# Patient Record
Sex: FEMALE | Race: WHITE | Employment: OTHER | URBAN - METROPOLITAN AREA
[De-identification: names, ages, dates, MRNs, and addresses within clinical notes are randomized per-mention and may not be internally consistent; named-entity substitution may affect disease eponyms.]

---

## 2021-10-04 ENCOUNTER — HOSPITAL ENCOUNTER (EMERGENCY)
Age: 31
Discharge: HOME | End: 2021-10-04

## 2021-10-04 VITALS
WEIGHT: 135 LBS | OXYGEN SATURATION: 98 % | TEMPERATURE: 98 F | SYSTOLIC BLOOD PRESSURE: 132 MMHG | DIASTOLIC BLOOD PRESSURE: 90 MMHG | HEIGHT: 62 IN | RESPIRATION RATE: 18 BRPM | BODY MASS INDEX: 24.84 KG/M2 | HEART RATE: 76 BPM

## 2021-10-04 DIAGNOSIS — S05.01XA ABRASION OF RIGHT CORNEA, INITIAL ENCOUNTER: Primary | ICD-10-CM

## 2021-10-04 PROCEDURE — 90471 IMMUNIZATION ADMIN: CPT

## 2021-10-04 PROCEDURE — 99283 EMERGENCY DEPT VISIT LOW MDM: CPT

## 2021-10-04 RX ORDER — OFLOXACIN 3 MG/ML
2 SOLUTION/ DROPS OPHTHALMIC 4 TIMES DAILY
Qty: 1 EACH | Refills: 0 | Status: SHIPPED | OUTPATIENT
Start: 2021-10-04 | End: 2021-10-09

## 2021-10-04 RX ORDER — TETRACAINE HYDROCHLORIDE 5 MG/ML
1 SOLUTION OPHTHALMIC ONCE
Status: COMPLETED | OUTPATIENT
Start: 2021-10-04 | End: 2021-10-04

## 2021-10-04 RX ORDER — HYDROCODONE BITARTRATE AND ACETAMINOPHEN 5; 325 MG/1; MG/1
1 TABLET ORAL ONCE
Status: COMPLETED | OUTPATIENT
Start: 2021-10-04 | End: 2021-10-04

## 2021-10-04 RX ORDER — BUDESONIDE AND FORMOTEROL FUMARATE DIHYDRATE 160; 4.5 UG/1; UG/1
AEROSOL RESPIRATORY (INHALATION) 2 TIMES DAILY
COMMUNITY

## 2021-10-04 NOTE — ED PROVIDER NOTES
Patient Seen in: THE [facility] Emergency Department In [place]      History   Patient presents with:   Eye Visual Problem    Stated Complaint: glitter in eye    Subjective:   HPI    52-year-old female who comes in today complaining of the significant pain not foreign body. No surrounding erythema or edema. Ears: Bilateral:TM clear and pearly gray color. No external auditory canal edema or discharge. No pinna, tragus, or mastoid TTP.   Nose: Nares symmetrical, No maxillary or frontal sinus tenderness   Thro patient instructions regarding her diagnosis, expectations, follow up, and return to the ER precautions. I explained to the patient that emergent conditions may arise to return to the immediate care or ER for new, worsening or any persistent conditions.

## 2021-10-04 NOTE — ED QUICK NOTES
No relief with tetracaine. Unable to complete visual acuity. Ice pack to right eye.  Right eye sclera is reddened and eye is tearing

## 2021-10-04 NOTE — ED INITIAL ASSESSMENT (HPI)
States yesterday while at music [place] she got something in her right eye possible glitter. Eye is watering and painful. Eye was flushed last night but continues to bother her. Unable to open eye without pain and watering.  Could not tolerate visual acuity

## 2023-03-26 ENCOUNTER — HOSPITAL ENCOUNTER (EMERGENCY)
Age: 33
Discharge: HOME | End: 2023-03-27
Attending: EMERGENCY MEDICINE
Payer: MEDICAID

## 2023-03-26 ENCOUNTER — APPOINTMENT (OUTPATIENT)
Dept: GENERAL RADIOLOGY | Age: 33
End: 2023-03-26
Attending: EMERGENCY MEDICINE
Payer: MEDICAID

## 2023-03-26 DIAGNOSIS — R07.89 CHEST PAIN, NON-CARDIAC: Primary | ICD-10-CM

## 2023-03-26 LAB
ALBUMIN SERPL-MCNC: 3.9 G/DL (ref 3.4–5)
ALBUMIN/GLOB SERPL: 1.3 {RATIO} (ref 1–2)
ALP LIVER SERPL-CCNC: 68 U/L
ALT SERPL-CCNC: 19 U/L
ANION GAP SERPL CALC-SCNC: 6 MMOL/L (ref 0–18)
AST SERPL-CCNC: 14 U/L (ref 15–37)
BASOPHILS # BLD AUTO: 0.08 X10(3) UL (ref 0–0.2)
BASOPHILS NFR BLD AUTO: 0.9 %
BILIRUB SERPL-MCNC: 0.3 MG/DL (ref 0.1–2)
BUN BLD-MCNC: 9 MG/DL (ref 7–18)
CALCIUM BLD-MCNC: 8.6 MG/DL (ref 8.5–10.1)
CHLORIDE SERPL-SCNC: 108 MMOL/L (ref 98–112)
CO2 SERPL-SCNC: 25 MMOL/L (ref 21–32)
CREAT BLD-MCNC: 0.82 MG/DL
D DIMER PPP FEU-MCNC: 0.3 UG/ML FEU (ref ?–0.5)
EOSINOPHIL # BLD AUTO: 0.5 X10(3) UL (ref 0–0.7)
EOSINOPHIL NFR BLD AUTO: 5.6 %
ERYTHROCYTE [DISTWIDTH] IN BLOOD BY AUTOMATED COUNT: 12.2 %
GFR SERPLBLD BASED ON 1.73 SQ M-ARVRAT: 98 ML/MIN/1.73M2 (ref 60–?)
GLOBULIN PLAS-MCNC: 3 G/DL (ref 2.8–4.4)
GLUCOSE BLD-MCNC: 131 MG/DL (ref 70–99)
HCT VFR BLD AUTO: 37.4 %
HGB BLD-MCNC: 12.6 G/DL
IMM GRANULOCYTES # BLD AUTO: 0.02 X10(3) UL (ref 0–1)
IMM GRANULOCYTES NFR BLD: 0.2 %
LYMPHOCYTES # BLD AUTO: 2.65 X10(3) UL (ref 1–4)
LYMPHOCYTES NFR BLD AUTO: 29.7 %
MCH RBC QN AUTO: 29.6 PG (ref 26–34)
MCHC RBC AUTO-ENTMCNC: 33.7 G/DL (ref 31–37)
MCV RBC AUTO: 88 FL
MONOCYTES # BLD AUTO: 0.63 X10(3) UL (ref 0.1–1)
MONOCYTES NFR BLD AUTO: 7.1 %
NEUTROPHILS # BLD AUTO: 5.05 X10 (3) UL (ref 1.5–7.7)
NEUTROPHILS # BLD AUTO: 5.05 X10(3) UL (ref 1.5–7.7)
NEUTROPHILS NFR BLD AUTO: 56.5 %
OSMOLALITY SERPL CALC.SUM OF ELEC: 288 MOSM/KG (ref 275–295)
PLATELET # BLD AUTO: 167 10(3)UL (ref 150–450)
POTASSIUM SERPL-SCNC: 3.9 MMOL/L (ref 3.5–5.1)
PROT SERPL-MCNC: 6.9 G/DL (ref 6.4–8.2)
RBC # BLD AUTO: 4.25 X10(6)UL
SODIUM SERPL-SCNC: 139 MMOL/L (ref 136–145)
TROPONIN I HIGH SENSITIVITY: 4 NG/L
WBC # BLD AUTO: 8.9 X10(3) UL (ref 4–11)

## 2023-03-26 PROCEDURE — 99285 EMERGENCY DEPT VISIT HI MDM: CPT

## 2023-03-26 PROCEDURE — 99284 EMERGENCY DEPT VISIT MOD MDM: CPT

## 2023-03-26 PROCEDURE — 80053 COMPREHEN METABOLIC PANEL: CPT | Performed by: EMERGENCY MEDICINE

## 2023-03-26 PROCEDURE — 93005 ELECTROCARDIOGRAM TRACING: CPT

## 2023-03-26 PROCEDURE — 96374 THER/PROPH/DIAG INJ IV PUSH: CPT

## 2023-03-26 PROCEDURE — 84484 ASSAY OF TROPONIN QUANT: CPT | Performed by: EMERGENCY MEDICINE

## 2023-03-26 PROCEDURE — 85025 COMPLETE CBC W/AUTO DIFF WBC: CPT | Performed by: EMERGENCY MEDICINE

## 2023-03-26 PROCEDURE — 85379 FIBRIN DEGRADATION QUANT: CPT | Performed by: EMERGENCY MEDICINE

## 2023-03-26 PROCEDURE — 93010 ELECTROCARDIOGRAM REPORT: CPT

## 2023-03-26 PROCEDURE — 71045 X-RAY EXAM CHEST 1 VIEW: CPT | Performed by: EMERGENCY MEDICINE

## 2023-03-26 RX ORDER — KETOROLAC TROMETHAMINE 30 MG/ML
30 INJECTION, SOLUTION INTRAMUSCULAR; INTRAVENOUS ONCE
Status: COMPLETED | OUTPATIENT
Start: 2023-03-26 | End: 2023-03-26

## 2023-03-27 VITALS
RESPIRATION RATE: 15 BRPM | HEART RATE: 79 BPM | DIASTOLIC BLOOD PRESSURE: 85 MMHG | WEIGHT: 145 LBS | SYSTOLIC BLOOD PRESSURE: 124 MMHG | BODY MASS INDEX: 25.69 KG/M2 | HEIGHT: 63 IN | TEMPERATURE: 98 F | OXYGEN SATURATION: 99 %

## 2023-03-27 LAB
ATRIAL RATE: 92 BPM
P AXIS: 68 DEGREES
P-R INTERVAL: 150 MS
Q-T INTERVAL: 368 MS
QRS DURATION: 76 MS
QTC CALCULATION (BEZET): 455 MS
R AXIS: 91 DEGREES
T AXIS: 36 DEGREES
VENTRICULAR RATE: 92 BPM

## 2023-03-27 NOTE — DISCHARGE INSTRUCTIONS
REST AT [employer] [facility] UP WITH YOUR PRIMARY CARE MD TO GET A FOLLOW UP CHEST XRAY  RETURN TO THE ED IF ANY OTHER PROBLEMS ARISE  NO MORE MARIJUANA PLEASE